# Patient Record
Sex: MALE | Race: WHITE
[De-identification: names, ages, dates, MRNs, and addresses within clinical notes are randomized per-mention and may not be internally consistent; named-entity substitution may affect disease eponyms.]

---

## 2019-04-07 ENCOUNTER — HOSPITAL ENCOUNTER (INPATIENT)
Dept: HOSPITAL 5 - ED | Age: 74
LOS: 3 days | Discharge: HOME HEALTH | DRG: 193 | End: 2019-04-10
Attending: INTERNAL MEDICINE | Admitting: INTERNAL MEDICINE
Payer: MEDICARE

## 2019-04-07 DIAGNOSIS — R09.02: ICD-10-CM

## 2019-04-07 DIAGNOSIS — E78.5: ICD-10-CM

## 2019-04-07 DIAGNOSIS — N18.1: ICD-10-CM

## 2019-04-07 DIAGNOSIS — J18.9: Primary | ICD-10-CM

## 2019-04-07 DIAGNOSIS — G93.41: ICD-10-CM

## 2019-04-07 DIAGNOSIS — E86.0: ICD-10-CM

## 2019-04-07 DIAGNOSIS — E11.65: ICD-10-CM

## 2019-04-07 DIAGNOSIS — E11.22: ICD-10-CM

## 2019-04-07 DIAGNOSIS — Z23: ICD-10-CM

## 2019-04-07 DIAGNOSIS — Z87.891: ICD-10-CM

## 2019-04-07 DIAGNOSIS — J20.9: ICD-10-CM

## 2019-04-07 DIAGNOSIS — J98.11: ICD-10-CM

## 2019-04-07 DIAGNOSIS — I12.9: ICD-10-CM

## 2019-04-07 LAB
ALBUMIN SERPL-MCNC: 3.7 G/DL (ref 3.9–5)
ALT SERPL-CCNC: 34 UNITS/L (ref 7–56)
BASOPHILS # (AUTO): 0 K/MM3 (ref 0–0.1)
BASOPHILS NFR BLD AUTO: 0.5 % (ref 0–1.8)
BILIRUB UR QL STRIP: (no result)
BLOOD UR QL VISUAL: (no result)
BUN SERPL-MCNC: 26 MG/DL (ref 9–20)
BUN/CREAT SERPL: 19 %
CALCIUM SERPL-MCNC: 9.9 MG/DL (ref 8.4–10.2)
EOSINOPHIL # BLD AUTO: 0 K/MM3 (ref 0–0.4)
EOSINOPHIL NFR BLD AUTO: 0 % (ref 0–4.3)
HCT VFR BLD CALC: 41.1 % (ref 35.5–45.6)
HEMOLYSIS INDEX: 59
HGB BLD-MCNC: 13.9 GM/DL (ref 11.8–15.2)
LYMPHOCYTES # BLD AUTO: 1.3 K/MM3 (ref 1.2–5.4)
LYMPHOCYTES NFR BLD AUTO: 15.9 % (ref 13.4–35)
MCHC RBC AUTO-ENTMCNC: 34 % (ref 32–34)
MCV RBC AUTO: 92 FL (ref 84–94)
MONOCYTES # (AUTO): 0.5 K/MM3 (ref 0–0.8)
MONOCYTES % (AUTO): 6.8 % (ref 0–7.3)
MUCOUS THREADS #/AREA URNS HPF: (no result) /HPF
PH UR STRIP: 5 [PH] (ref 5–7)
PLATELET # BLD: 167 K/MM3 (ref 140–440)
RBC # BLD AUTO: 4.47 M/MM3 (ref 3.65–5.03)
RBC #/AREA URNS HPF: 2 /HPF (ref 0–6)
UROBILINOGEN UR-MCNC: < 2 MG/DL (ref ?–2)
WBC #/AREA URNS HPF: < 1 /HPF (ref 0–6)

## 2019-04-07 PROCEDURE — 93005 ELECTROCARDIOGRAM TRACING: CPT

## 2019-04-07 PROCEDURE — 83036 HEMOGLOBIN GLYCOSYLATED A1C: CPT

## 2019-04-07 PROCEDURE — 87400 INFLUENZA A/B EACH AG IA: CPT

## 2019-04-07 PROCEDURE — 96365 THER/PROPH/DIAG IV INF INIT: CPT

## 2019-04-07 PROCEDURE — 82962 GLUCOSE BLOOD TEST: CPT

## 2019-04-07 PROCEDURE — 36415 COLL VENOUS BLD VENIPUNCTURE: CPT

## 2019-04-07 PROCEDURE — 96366 THER/PROPH/DIAG IV INF ADDON: CPT

## 2019-04-07 PROCEDURE — 81001 URINALYSIS AUTO W/SCOPE: CPT

## 2019-04-07 PROCEDURE — 93010 ELECTROCARDIOGRAM REPORT: CPT

## 2019-04-07 PROCEDURE — 90732 PPSV23 VACC 2 YRS+ SUBQ/IM: CPT

## 2019-04-07 PROCEDURE — 84484 ASSAY OF TROPONIN QUANT: CPT

## 2019-04-07 PROCEDURE — 96367 TX/PROPH/DG ADDL SEQ IV INF: CPT

## 2019-04-07 PROCEDURE — 85025 COMPLETE CBC W/AUTO DIFF WBC: CPT

## 2019-04-07 PROCEDURE — 83735 ASSAY OF MAGNESIUM: CPT

## 2019-04-07 PROCEDURE — 87040 BLOOD CULTURE FOR BACTERIA: CPT

## 2019-04-07 PROCEDURE — 82140 ASSAY OF AMMONIA: CPT

## 2019-04-07 PROCEDURE — 80053 COMPREHEN METABOLIC PANEL: CPT

## 2019-04-07 PROCEDURE — 96375 TX/PRO/DX INJ NEW DRUG ADDON: CPT

## 2019-04-07 PROCEDURE — 87086 URINE CULTURE/COLONY COUNT: CPT

## 2019-04-07 PROCEDURE — 96372 THER/PROPH/DIAG INJ SC/IM: CPT

## 2019-04-07 PROCEDURE — 71045 X-RAY EXAM CHEST 1 VIEW: CPT

## 2019-04-07 RX ADMIN — GLIPIZIDE SCH MG: 5 TABLET ORAL at 11:40

## 2019-04-07 RX ADMIN — LISINOPRIL SCH MG: 20 TABLET ORAL at 11:10

## 2019-04-07 RX ADMIN — SENNOSIDES SCH MG: 8.6 TABLET, FILM COATED ORAL at 11:45

## 2019-04-07 RX ADMIN — PRAVASTATIN SODIUM SCH MG: 40 TABLET ORAL at 22:15

## 2019-04-07 RX ADMIN — METFORMIN HYDROCHLORIDE SCH MG: 500 TABLET ORAL at 14:05

## 2019-04-07 RX ADMIN — SODIUM CHLORIDE SCH MLS/HR: 0.9 INJECTION, SOLUTION INTRAVENOUS at 12:30

## 2019-04-07 RX ADMIN — SENNOSIDES SCH MG: 8.6 TABLET, FILM COATED ORAL at 22:15

## 2019-04-07 RX ADMIN — AMLODIPINE BESYLATE SCH MG: 10 TABLET ORAL at 11:09

## 2019-04-07 RX ADMIN — AZITHROMYCIN SCH MLS/HR: 500 INJECTION, POWDER, LYOPHILIZED, FOR SOLUTION INTRAVENOUS at 10:43

## 2019-04-07 RX ADMIN — Medication SCH ML: at 22:24

## 2019-04-07 RX ADMIN — ENOXAPARIN SODIUM SCH MG: 100 INJECTION SUBCUTANEOUS at 11:09

## 2019-04-07 RX ADMIN — SODIUM CHLORIDE SCH MLS/HR: 0.9 INJECTION, SOLUTION INTRAVENOUS at 22:15

## 2019-04-07 RX ADMIN — Medication SCH ML: at 11:11

## 2019-04-07 RX ADMIN — HYDROCHLOROTHIAZIDE SCH MG: 12.5 CAPSULE ORAL at 11:09

## 2019-04-07 RX ADMIN — CEFTRIAXONE SODIUM SCH MLS/HR: 1 INJECTION, POWDER, FOR SOLUTION INTRAMUSCULAR; INTRAVENOUS at 06:09

## 2019-04-07 NOTE — XRAY REPORT
PROCEDURE: XR CHEST 1V AP 

 

TECHNIQUE:  Single AP chest 

 

HISTORY: cough 

 

COMPARISONS: No priors  

 

FINDINGS: 

 

Cardiac silhouette at the upper limits of normal. 

Mild interstitial prominence, likely chronic 

No evidence of airspace consolidation or pleural effusions. 

Right basilar atelectasis. 

 

 

IMPRESSION:  

 

Right basilar atelectasis, no other radiographic evidence of acute disease.. 

 

This document is electronically signed by Kb Shepherd MD., April 7 2019 02:13:06 AM ET

## 2019-04-07 NOTE — PROGRESS NOTE
Assessment and Plan


Assessment and plan: 


--Metabolic encephalopathy; multifactorial


Elderly patient possible dementia, rule out sepsis


Supportive care





--Right-sided atelectasis/pneumonia


Empiric antibiotics, follow cultures





--Uncontrolled diabetes mellitus; sugars and 400s


Patient is on oral hypoglycemics at home, resume metformin and glipizide


Check A1c, ADA diet





--Hypertension; well controlled


Continue current antihypertensives amlodipine


When necessary medications





--Dyslipidemia; stable on statin, low cholesterol diet





--DVT prophylaxis; Lovenox/SCD





--Full CODE STATUS





Monitor closely and adjust the management as needed





Advanced care planning 32 minutes





History


Interval history: 


Patient seen and examined in the ER elevating bed assignment


Medical records reviewed, no new events reported by the nursing


Patient is confused with altered level of consciousness


Discharge no acute distress


Vital signs noted








Hospitalist Physical





- Constitutional


Vitals: 


                                        











Temp Pulse Resp BP Pulse Ox


 


 101.9 F H  96 H  21   149/71   92 


 


 04/07/19 01:07  04/07/19 04:00  04/07/19 04:00  04/07/19 04:00  04/07/19 04:00











General appearance: Present: no acute distress, well-nourished, other ( 

confused)





- EENT


Eyes: Present: PERRL, EOM intact





- Neck


Neck: Present: supple, normal ROM





- Respiratory


Respiratory effort: normal


Respiratory: bilateral: diminished, rhonchi, negative: rales, wheezing





- Cardiovascular


Rhythm: regular


Heart Sounds: Present: S1 & S2





- Extremities


Extremities: no ischemia, No edema





- Abdominal


General gastrointestinal: soft, non-tender, non-distended, normal bowel sounds





- Integumentary


Integumentary: Present: clear, warm





- Psychiatric


Psychiatric: appropriate mood/affect, cooperative





- Neurologic


Neurologic: CNII-XII intact, moves all extremities





Results





- Labs


CBC & Chem 7: 


                                 04/07/19 02:08





                                 04/07/19 01:57


Labs: 


                             Laboratory Last Values











WBC  8.1 K/mm3 (4.5-11.0)   04/07/19  02:08    


 


RBC  4.47 M/mm3 (3.65-5.03)   04/07/19  02:08    


 


Hgb  13.9 gm/dl (11.8-15.2)   04/07/19  02:08    


 


Hct  41.1 % (35.5-45.6)   04/07/19  02:08    


 


MCV  92 fl (84-94)   04/07/19  02:08    


 


MCH  31 pg (28-32)   04/07/19  02:08    


 


MCHC  34 % (32-34)   04/07/19  02:08    


 


RDW  13.9 % (13.2-15.2)   04/07/19  02:08    


 


Plt Count  167 K/mm3 (140-440)   04/07/19  02:08    


 


Lymph % (Auto)  15.9 % (13.4-35.0)   04/07/19  02:08    


 


Mono % (Auto)  6.8 % (0.0-7.3)   04/07/19  02:08    


 


Eos % (Auto)  0.0 % (0.0-4.3)   04/07/19  02:08    


 


Baso % (Auto)  0.5 % (0.0-1.8)   04/07/19  02:08    


 


Lymph #  1.3 K/mm3 (1.2-5.4)   04/07/19  02:08    


 


Mono #  0.5 K/mm3 (0.0-0.8)   04/07/19  02:08    


 


Eos #  0.0 K/mm3 (0.0-0.4)   04/07/19  02:08    


 


Baso #  0.0 K/mm3 (0.0-0.1)   04/07/19  02:08    


 


Seg Neutrophils %  76.8 % (40.0-70.0)  H  04/07/19  02:08    


 


Seg Neutrophils #  6.2 K/mm3 (1.8-7.7)   04/07/19  02:08    


 


Sodium  138 mmol/L (137-145)   04/07/19  01:57    


 


Potassium  4.8 mmol/L (3.6-5.0)   04/07/19  01:57    


 


Chloride  98.4 mmol/L ()   04/07/19  01:57    


 


Carbon Dioxide  24 mmol/L (22-30)   04/07/19  01:57    


 


Anion Gap  20 mmol/L  04/07/19  01:57    


 


BUN  26 mg/dL (9-20)  H  04/07/19  01:57    


 


Creatinine  1.4 mg/dL (0.8-1.5)   04/07/19  01:57    


 


Estimated GFR  49 ml/min  04/07/19  01:57    


 


BUN/Creatinine Ratio  19 %  04/07/19  01:57    


 


Glucose  460 mg/dL ()  H  04/07/19  01:57    


 


POC Glucose  292  ()  H  04/07/19  06:04    


 


Lactic Acid  1.50 mmol/L (0.7-2.0)   04/07/19  04:44    


 


Calcium  9.9 mg/dL (8.4-10.2)   04/07/19  01:57    


 


Total Bilirubin  0.30 mg/dL (0.1-1.2)   04/07/19  01:57    


 


AST  39 units/L (5-40)   04/07/19  01:57    


 


ALT  34 units/L (7-56)   04/07/19  01:57    


 


Alkaline Phosphatase  63 units/L ()   04/07/19  01:57    


 


Troponin T  < 0.010 ng/mL (0.00-0.029)   04/07/19  01:57    


 


Total Protein  7.6 g/dL (6.3-8.2)   04/07/19  01:57    


 


Albumin  3.7 g/dL (3.9-5)  L  04/07/19  01:57    


 


Albumin/Globulin Ratio  0.9 %  04/07/19  01:57    


 


Urine Color  Straw  (Yellow)   04/07/19  02:23    


 


Urine Turbidity  Clear  (Clear)   04/07/19  02:23    


 


Urine pH  5.0  (5.0-7.0)   04/07/19  02:23    


 


Ur Specific Gravity  1.032  (1.003-1.030)  H  04/07/19  02:23    


 


Urine Protein  100 mg/dl mg/dL (Negative)   04/07/19  02:23    


 


Urine Glucose (UA)  >=500 mg/dL (Negative)   04/07/19  02:23    


 


Urine Ketones  20 mg/dL (Negative)   04/07/19  02:23    


 


Urine Blood  Mod  (Negative)   04/07/19  02:23    


 


Urine Nitrite  Neg  (Negative)   04/07/19  02:23    


 


Urine Bilirubin  Neg  (Negative)   04/07/19  02:23    


 


Urine Urobilinogen  < 2.0 mg/dL (<2.0)   04/07/19  02:23    


 


Ur Leukocyte Esterase  Neg  (Negative)   04/07/19  02:23    


 


Urine WBC (Auto)  < 1.0 /HPF (0.0-6.0)   04/07/19  02:23    


 


Urine RBC (Auto)  2.0 /HPF (0.0-6.0)   04/07/19  02:23    


 


Urine Mucus  Few /HPF  04/07/19  02:23    


 


Influenza A (Rapid)  Negative  (Negative)   04/07/19  03:06    


 


Influenza B (Rapid)  Negative  (Negative)   04/07/19  03:06    














Active Medications





- Current Medications


Current Medications: 














Generic Name Dose Route Start Last Admin





  Trade Name Freq  PRN Reason Stop Dose Admin


 


Acetaminophen  650 mg  04/07/19 04:37 





  Tylenol  PO  





  Q4H PRN  





  Pain MILD(1-3)/Fever >100.5/HA  


 


Amlodipine Besylate  10 mg  04/07/19 10:00 





  Norvasc  PO  





  DAILY [facility]  


 


Dextrose  50 ml  04/07/19 05:35 





  D50w (25gm) Syringe  IV  





  PRN PRN  





  Hypoglycemia  


 


Enoxaparin Sodium  30 mg  04/07/19 10:00 





  Lovenox  SUB-Q  





  QDAY [facility]  


 


Glipizide  5 mg  04/07/19 10:00 





  Glucotrol  PO  





  DAILY [facility]  


 


Hydrochlorothiazide  12.5 mg  04/07/19 10:00 





  Hctz  PO  





  QDAY [facility]  


 


Sodium Chloride  1,000 mls @ 75 mls/hr  04/07/19 05:00 





  Nacl 0.9% 1000 Ml  IV  





  AS DIRECT [facility]  


 


Azithromycin 500 mg/ Sodium  250 mls @ 250 mls/hr  04/07/19 10:00 





  Chloride  IV  





  Q24HR [facility]  


 


Ceftriaxone Sodium  1 gm in 50 mls @ 100 mls/hr  04/07/19 06:00  04/07/19 06:09





  Rocephin/Ns 1 Gm/50 Ml  IV   100 mls/hr





  Q24H [facility]   Administration





  Protocol  


 


Insulin Human Lispro  0 unit  04/07/19 06:00  04/07/19 06:09





  Humalog  SUB-Q   6 unit





  Q6HR [facility]   Administration





  Protocol  


 


Lisinopril  20 mg  04/07/19 10:00 





  Zestril  PO  





  QDAY [facility]  


 


Miscellaneous Medication  1,000 mg  04/07/19 10:00 





  Metformin Hcl [Glucophage]  PO  





  DAILY [facility]  


 


Niacin  500 mg  04/07/19 22:00 





  Niaspan Er  PO  





  HS [facility]  


 


Ondansetron HCl  4 mg  04/07/19 04:37 





  Zofran  IV  





  Q8H PRN  





  Nausea And Vomiting  


 


Pravastatin Sodium  40 mg  04/07/19 22:00 





  Pravachol  PO  





  QHS [facility]  


 


Senna  8.6 mg  04/07/19 10:00 





  Senokot  PO  





  Q12HR [facility]  


 


Sodium Chloride  10 ml  04/07/19 10:00 





  Sodium Chloride Flush Syringe 10 Ml  IV  





  BID [facility]  


 


Sodium Chloride  10 ml  04/07/19 04:37 





  Sodium Chloride Flush Syringe 10 Ml  IV  





  PRN PRN  





  LINE FLUSH

## 2019-04-07 NOTE — HISTORY AND PHYSICAL REPORT
<SAINT-MAJOR,HURLINE - Last Filed: 04/07/19 06:19>





History of Present Illness


Date of examination: 04/07/19


Date of admission: 


04/07/2019


Chief complaint: 





Weakness, change in mental status, cough


History of present illness: 





Patient is a 73-year-old male with PMHx of DM type II (insulin-dependent), 

hypertension, kidney disease who presents to the ER tonight with family for 

complaints of cough, poor appetite, and weakness.  Patient's history was 

obtained from patient's daughter in room, he states that they decided to bring 

him to the hospital because of the frequent cough.  Pt was seen in the ER with 

no acute distress, he is resting quietly, respiration is even and unlabored, his

temperature on arrival was 101.9., his blood glucose is 460, his WBCs 8.1, his 

UA is negative, his chest x-ray showed right basilar atelectasis otherwise 

normal patient is admitted for acute bronchitis and febrile illness.





Past History


Past Medical History: diabetes, hypertension, other (ckd)


Past Surgical History: No surgical history


Social history: no significant social history


Family history: no significant family history





Medications and Allergies


                                    Allergies











Allergy/AdvReac Type Severity Reaction Status Date / Time


 


No Known Allergies Allergy   Verified 04/07/19 05:55











                                Home Medications











 Medication  Instructions  Recorded  Confirmed  Last Taken  Type


 


Benazepril/Hydrochlorothiazide 1 each PO DAILY 04/07/19 04/07/19 Unknown History





[Benazepril-Hctz 20-12.5 mg Tab]     


 


Metformin HCl [Glucophage] 1,000 mg PO DAILY 04/07/19 04/07/19 Unknown History


 


Niacin [Niacor] 500 mg PO DAILY 04/07/19 04/07/19 Unknown History


 


Simvastatin [Zocor] 20 mg PO DAILY 04/07/19 04/07/19 Unknown History


 


amLODIPine [Norvasc] 10 mg PO DAILY 04/07/19 04/07/19 Unknown History


 


glipiZIDE [Glipizide] 5 mg PO DAILY 04/07/19 04/07/19 Unknown History











Active Meds: 


Active Medications





Acetaminophen (Tylenol)  650 mg PO Q4H PRN


   PRN Reason: Pain MILD(1-3)/Fever >100.5/HA


Sodium Chloride (Nacl 0.9% 1000 Ml)  1,000 mls @ 75 mls/hr IV AS DIRECT [facility]


Ondansetron HCl (Zofran)  4 mg IV Q8H PRN


   PRN Reason: Nausea And Vomiting


Senna (Senokot)  8.6 mg PO Q12HR [facility]


Sodium Chloride (Sodium Chloride Flush Syringe 10 Ml)  10 ml IV BID [facility]


Sodium Chloride (Sodium Chloride Flush Syringe 10 Ml)  10 ml IV PRN PRN


   PRN Reason: LINE FLUSH











Review of Systems


Constitutional: poor appetite


Respiratory: cough


Gastrointestinal: abdominal pain


Neurological: weakness





Exam





- Constitutional


Vitals: 


                                        











Temp Pulse Resp BP Pulse Ox


 


 101.9 F H  96 H  21   149/71   92 


 


 04/07/19 01:07  04/07/19 04:00  04/07/19 04:00  04/07/19 04:00  04/07/19 04:00











General appearance: Present: no acute distress





- EENT


Eyes: Present: EOM intact


ENT: hearing intact





- Neck


Neck: Present: normal ROM





- Respiratory


Respiratory effort: normal


Respiratory: bilateral: CTA (anterior lungs(), diminished (at the bases)





- Cardiovascular


Rhythm: regular


Heart Sounds: Present: S1 & S2 (assessment didn't take any medication on a of 

the results by)





- Extremities


Extremities: no ischemia ( her son is through the medullary)


Peripheral Pulses: within normal limits





- Abdominal


General gastrointestinal: Present: deferred


Male genitourinary: Present: deferred





- Rectal


Rectal Exam: deferred (she has)





- Integumentary


Integumentary: Present: warm, dry





- Musculoskeletal


Musculoskeletal: strength equal bilaterally





- Psychiatric


Psychiatric: cooperative





- Neurologic


Neurologic: moves all extremities





Results





- Labs


CBC & Chem 7: 


                                 04/07/19 02:08





                                 04/07/19 01:57


Labs: 


                             Laboratory Last Values











WBC  8.1 K/mm3 (4.5-11.0)   04/07/19  02:08    


 


RBC  4.47 M/mm3 (3.65-5.03)   04/07/19  02:08    


 


Hgb  13.9 gm/dl (11.8-15.2)   04/07/19  02:08    


 


Hct  41.1 % (35.5-45.6)   04/07/19  02:08    


 


MCV  92 fl (84-94)   04/07/19  02:08    


 


MCH  31 pg (28-32)   04/07/19  02:08    


 


MCHC  34 % (32-34)   04/07/19  02:08    


 


RDW  13.9 % (13.2-15.2)   04/07/19  02:08    


 


Plt Count  167 K/mm3 (140-440)   04/07/19  02:08    


 


Lymph % (Auto)  15.9 % (13.4-35.0)   04/07/19  02:08    


 


Mono % (Auto)  6.8 % (0.0-7.3)   04/07/19  02:08    


 


Eos % (Auto)  0.0 % (0.0-4.3)   04/07/19  02:08    


 


Baso % (Auto)  0.5 % (0.0-1.8)   04/07/19  02:08    


 


Lymph #  1.3 K/mm3 (1.2-5.4)   04/07/19  02:08    


 


Mono #  0.5 K/mm3 (0.0-0.8)   04/07/19  02:08    


 


Eos #  0.0 K/mm3 (0.0-0.4)   04/07/19  02:08    


 


Baso #  0.0 K/mm3 (0.0-0.1)   04/07/19  02:08    


 


Seg Neutrophils %  76.8 % (40.0-70.0)  H  04/07/19  02:08    


 


Seg Neutrophils #  6.2 K/mm3 (1.8-7.7)   04/07/19  02:08    


 


Sodium  138 mmol/L (137-145)   04/07/19  01:57    


 


Potassium  4.8 mmol/L (3.6-5.0)   04/07/19  01:57    


 


Chloride  98.4 mmol/L ()   04/07/19  01:57    


 


Carbon Dioxide  24 mmol/L (22-30)   04/07/19  01:57    


 


Anion Gap  20 mmol/L  04/07/19  01:57    


 


BUN  26 mg/dL (9-20)  H  04/07/19  01:57    


 


Creatinine  1.4 mg/dL (0.8-1.5)   04/07/19  01:57    


 


Estimated GFR  49 ml/min  04/07/19  01:57    


 


BUN/Creatinine Ratio  19 %  04/07/19  01:57    


 


Glucose  460 mg/dL ()  H  04/07/19  01:57    


 


POC Glucose  469  ()  H  04/07/19  03:35    


 


Lactic Acid  1.50 mmol/L (0.7-2.0)   04/07/19  04:44    


 


Calcium  9.9 mg/dL (8.4-10.2)   04/07/19  01:57    


 


Total Bilirubin  0.30 mg/dL (0.1-1.2)   04/07/19  01:57    


 


AST  39 units/L (5-40)   04/07/19  01:57    


 


ALT  34 units/L (7-56)   04/07/19  01:57    


 


Alkaline Phosphatase  63 units/L ()   04/07/19  01:57    


 


Troponin T  < 0.010 ng/mL (0.00-0.029)   04/07/19  01:57    


 


Total Protein  7.6 g/dL (6.3-8.2)   04/07/19  01:57    


 


Albumin  3.7 g/dL (3.9-5)  L  04/07/19  01:57    


 


Albumin/Globulin Ratio  0.9 %  04/07/19  01:57    


 


Urine Color  Straw  (Yellow)   04/07/19  02:23    


 


Urine Turbidity  Clear  (Clear)   04/07/19  02:23    


 


Urine pH  5.0  (5.0-7.0)   04/07/19  02:23    


 


Ur Specific Gravity  1.032  (1.003-1.030)  H  04/07/19  02:23    


 


Urine Protein  100 mg/dl mg/dL (Negative)   04/07/19  02:23    


 


Urine Glucose (UA)  >=500 mg/dL (Negative)   04/07/19  02:23    


 


Urine Ketones  20 mg/dL (Negative)   04/07/19  02:23    


 


Urine Blood  Mod  (Negative)   04/07/19  02:23    


 


Urine Nitrite  Neg  (Negative)   04/07/19  02:23    


 


Urine Bilirubin  Neg  (Negative)   04/07/19  02:23    


 


Urine Urobilinogen  < 2.0 mg/dL (<2.0)   04/07/19  02:23    


 


Ur Leukocyte Esterase  Neg  (Negative)   04/07/19  02:23    


 


Urine WBC (Auto)  < 1.0 /HPF (0.0-6.0)   04/07/19  02:23    


 


Urine RBC (Auto)  2.0 /HPF (0.0-6.0)   04/07/19  02:23    


 


Urine Mucus  Few /HPF  04/07/19  02:23    


 


Influenza A (Rapid)  Negative  (Negative)   04/07/19  03:06    


 


Influenza B (Rapid)  Negative  (Negative)   04/07/19  03:06    














Assessment and Plan


Assessment and plan: 





1.  Acute febrile illness


2.  Cough  (influenza A and B neg)


3.  Right basilar atelectasis for x-ray


4.  DM type II (uncontrolled) 


5.  CKD stage1 (BUN/creatinine WNL)


6. Hypertension


7. Dehydration





Plan:


Patient is admitted for febrile illness


Continue IV fluid normal saline at 75/hour


Zithromax 500 IV daily


Accu-Chek with insulin per sliding scale


Supports his care


Resume home meds


DVT prophylaxis with Lovenox


Plan of care discussed with the family


Patient's condition and plan of care d/w with Dr. Ba


Advance Directives: Yes


VTE prophylaxis?: Chemical


Reason for no VTE Prophylaxis: Surgical contraindication





<NII BA E - Last Filed: 04/07/19 06:43>





Medications and Allergies


Active Meds: 


Active Medications





Acetaminophen (Tylenol)  650 mg PO Q4H PRN


   PRN Reason: Pain MILD(1-3)/Fever >100.5/HA


Dextrose (D50w (25gm) Syringe)  50 ml IV PRN PRN


   PRN Reason: Hypoglycemia


Sodium Chloride (Nacl 0.9% 1000 Ml)  1,000 mls @ 75 mls/hr IV AS DIRECT [facility]


Azithromycin 500 mg/ Sodium (Chloride)  250 mls @ 250 mls/hr IV Q24HR [facility]


Ceftriaxone Sodium (Rocephin/Ns 1 Gm/50 Ml)  1 gm in 50 mls @ 100 mls/hr IV Q24H

 [facility]; Protocol


   Last Admin: 04/07/19 06:09 Dose:  100 mls/hr


   Documented by: 


Insulin Human Lispro (Humalog)  0 unit SUB-Q Q6HR [facility]; Protocol


   Last Admin: 04/07/19 06:09 Dose:  6 unit


   Documented by: 


Ondansetron HCl (Zofran)  4 mg IV Q8H PRN


   PRN Reason: Nausea And Vomiting


Senna (Senokot)  8.6 mg PO Q12HR [facility]


Sodium Chloride (Sodium Chloride Flush Syringe 10 Ml)  10 ml IV BID [facility]


Sodium Chloride (Sodium Chloride Flush Syringe 10 Ml)  10 ml IV PRN PRN


   PRN Reason: LINE FLUSH











Exam





- Constitutional


Vitals: 


                                        











Temp Pulse Resp BP Pulse Ox


 


 101.9 F H  96 H  21   149/71   92 


 


 04/07/19 01:07  04/07/19 04:00  04/07/19 04:00  04/07/19 04:00  04/07/19 04:00














Results





- Labs


CBC & Chem 7: 


                                 04/07/19 02:08





                                 04/07/19 01:57


Labs: 


                             Laboratory Last Values











WBC  8.1 K/mm3 (4.5-11.0)   04/07/19  02:08    


 


RBC  4.47 M/mm3 (3.65-5.03)   04/07/19  02:08    


 


Hgb  13.9 gm/dl (11.8-15.2)   04/07/19  02:08    


 


Hct  41.1 % (35.5-45.6)   04/07/19  02:08    


 


MCV  92 fl (84-94)   04/07/19  02:08    


 


MCH  31 pg (28-32)   04/07/19  02:08    


 


MCHC  34 % (32-34)   04/07/19  02:08    


 


RDW  13.9 % (13.2-15.2)   04/07/19  02:08    


 


Plt Count  167 K/mm3 (140-440)   04/07/19  02:08    


 


Lymph % (Auto)  15.9 % (13.4-35.0)   04/07/19  02:08    


 


Mono % (Auto)  6.8 % (0.0-7.3)   04/07/19  02:08    


 


Eos % (Auto)  0.0 % (0.0-4.3)   04/07/19  02:08    


 


Baso % (Auto)  0.5 % (0.0-1.8)   04/07/19  02:08    


 


Lymph #  1.3 K/mm3 (1.2-5.4)   04/07/19  02:08    


 


Mono #  0.5 K/mm3 (0.0-0.8)   04/07/19  02:08    


 


Eos #  0.0 K/mm3 (0.0-0.4)   04/07/19  02:08    


 


Baso #  0.0 K/mm3 (0.0-0.1)   04/07/19  02:08    


 


Seg Neutrophils %  76.8 % (40.0-70.0)  H  04/07/19  02:08    


 


Seg Neutrophils #  6.2 K/mm3 (1.8-7.7)   04/07/19  02:08    


 


Sodium  138 mmol/L (137-145)   04/07/19  01:57    


 


Potassium  4.8 mmol/L (3.6-5.0)   04/07/19  01:57    


 


Chloride  98.4 mmol/L ()   04/07/19  01:57    


 


Carbon Dioxide  24 mmol/L (22-30)   04/07/19  01:57    


 


Anion Gap  20 mmol/L  04/07/19  01:57    


 


BUN  26 mg/dL (9-20)  H  04/07/19  01:57    


 


Creatinine  1.4 mg/dL (0.8-1.5)   04/07/19  01:57    


 


Estimated GFR  49 ml/min  04/07/19  01:57    


 


BUN/Creatinine Ratio  19 %  04/07/19  01:57    


 


Glucose  460 mg/dL ()  H  04/07/19  01:57    


 


POC Glucose  292  ()  H  04/07/19  06:04    


 


Lactic Acid  1.50 mmol/L (0.7-2.0)   04/07/19  04:44    


 


Calcium  9.9 mg/dL (8.4-10.2)   04/07/19  01:57    


 


Total Bilirubin  0.30 mg/dL (0.1-1.2)   04/07/19  01:57    


 


AST  39 units/L (5-40)   04/07/19  01:57    


 


ALT  34 units/L (7-56)   04/07/19  01:57    


 


Alkaline Phosphatase  63 units/L ()   04/07/19  01:57    


 


Troponin T  < 0.010 ng/mL (0.00-0.029)   04/07/19  01:57    


 


Total Protein  7.6 g/dL (6.3-8.2)   04/07/19  01:57    


 


Albumin  3.7 g/dL (3.9-5)  L  04/07/19  01:57    


 


Albumin/Globulin Ratio  0.9 %  04/07/19  01:57    


 


Urine Color  Straw  (Yellow)   04/07/19  02:23    


 


Urine Turbidity  Clear  (Clear)   04/07/19  02:23    


 


Urine pH  5.0  (5.0-7.0)   04/07/19  02:23    


 


Ur Specific Gravity  1.032  (1.003-1.030)  H  04/07/19  02:23    


 


Urine Protein  100 mg/dl mg/dL (Negative)   04/07/19  02:23    


 


Urine Glucose (UA)  >=500 mg/dL (Negative)   04/07/19  02:23    


 


Urine Ketones  20 mg/dL (Negative)   04/07/19  02:23    


 


Urine Blood  Mod  (Negative)   04/07/19  02:23    


 


Urine Nitrite  Neg  (Negative)   04/07/19  02:23    


 


Urine Bilirubin  Neg  (Negative)   04/07/19  02:23    


 


Urine Urobilinogen  < 2.0 mg/dL (<2.0)   04/07/19  02:23    


 


Ur Leukocyte Esterase  Neg  (Negative)   04/07/19  02:23    


 


Urine WBC (Auto)  < 1.0 /HPF (0.0-6.0)   04/07/19  02:23    


 


Urine RBC (Auto)  2.0 /HPF (0.0-6.0)   04/07/19  02:23    


 


Urine Mucus  Few /HPF  04/07/19  02:23    


 


Influenza A (Rapid)  Negative  (Negative)   04/07/19  03:06    


 


Influenza B (Rapid)  Negative  (Negative)   04/07/19  03:06    














Assessment and Plan


Assessment and plan: 


73 year old man with hypertension, diabetes , CKD is brought to the emergency 

room by his daughter, complaining of cough, generalized weakness and decreased 

oral intake.  Chest x-ray significant for pneumonia, start Rocephin, 

azithromycin.  Patient seen and examined with NP.

## 2019-04-07 NOTE — EMERGENCY DEPARTMENT REPORT
ED Altered Mental Status HPI





- General


Chief Complaint: Altered Mental Status


Stated Complaint: HYPERGLYCEMIA


Time Seen by Provider: 04/07/19 01:11


Source: EMS


Mode of arrival: Stretcher


Limitations: Language Barrier





- History of Present Illness


Initial Comments: 





73-year-old male history of hypertension, diabetes, chronic kidney disease 

presents to ED with fever and altered mental status.  Patient's daughter states 

symptoms began yesterday.  Patient has generalized weakness, decreased by mouth 

intake and seems somewhat confused as he was urinating on himself which is 

unlike him.  Today patient developed a fever.  Reports associated cough.  

Patient denies chest pain, shortness of breath, vomiting, diarrhea, headache.  

Patient did get a flu vaccination this season.





PCP: Dr Mikey Hampton


Nephrologist: Dr Gill LIU Complaint: altered mental status


-: days(s) (2)


Severity: moderate


Consistency of Symptoms: waxing and waning


Context: recent fever, diabetes


Associated Symptoms: cough, fever/chills, loss of appetite, incontinence.  

denies: chest pain, headaches, nausea/vomiting, shortness of breath, diarrhea





- Related Data


                                    Allergies











Allergy/AdvReac Type Severity Reaction Status Date / Time


 


No Known Allergies Allergy   Unverified 11/01/16 02:50














ED Review of Systems


ROS: 


Stated complaint: HYPERGLYCEMIA


Other details as noted in HPI





Comment: All other systems reviewed and negative


Constitutional: fever


Respiratory: cough.  denies: shortness of breath


Cardiovascular: denies: chest pain


Gastrointestinal: denies: abdominal pain, nausea, vomiting, diarrhea


Genitourinary: other (reports urinary incontinence)


Neurological: denies: headache





ED Past Medical Hx





- Past Medical History


Previous Medical History?: Yes


Hx Hypertension: Yes


Hx Diabetes: Yes (type 1)


Additional medical history: HLD





- Surgical History


Past Surgical History?: No





- Social History


Smoking Status: Former Smoker


Substance Use Type: None





ED Physical Exam





- General


Limitations: Language Barrier


General appearance: alert, in no apparent distress





- Head


Head exam: Present: atraumatic, normocephalic





- Eye


Eye exam: Present: normal appearance, PERRL, EOMI





- ENT


ENT exam: Present: mucous membranes moist





- Neck


Neck exam: Present: normal inspection





- Respiratory


Respiratory exam: Present: rales (left lung base)





- Cardiovascular


Cardiovascular Exam: Present: regular rate, normal rhythm





- GI/Abdominal


GI/Abdominal exam: Present: soft.  Absent: distended, tenderness





- Extremities Exam


Extremities exam: Present: normal inspection





- Neurological Exam


Neurological exam: Present: alert, oriented X3, CN II-XII intact.  Absent: motor

sensory deficit





- Psychiatric


Psychiatric exam: Present: normal affect, normal mood





- Skin


Skin exam: Present: warm, dry, intact, normal color





ED Course


                                   Vital Signs











  04/07/19 04/07/19 04/07/19





  01:07 01:18 01:42


 


Temperature 101.9 F H  


 


Pulse Rate 93 H 94 H 


 


Respiratory 22 19 22





Rate   


 


Blood Pressure 177/77  


 


O2 Sat by Pulse 94  94





Oximetry   














  04/07/19 04/07/19 04/07/19





  02:00 03:00 04:00


 


Temperature   


 


Pulse Rate 96 H 91 H 96 H


 


Respiratory 25 H 24 21





Rate   


 


Blood Pressure 165/70 146/74 149/71


 


O2 Sat by Pulse 92 88 92





Oximetry   














- Lab Data


Result diagrams: 


                                 04/07/19 02:08





                                 04/07/19 01:57


                                   Lab Results











  04/07/19 04/07/19 04/07/19 Range/Units





  01:57 01:57 02:08 


 


WBC    8.1  (4.5-11.0)  K/mm3


 


RBC    4.47  (3.65-5.03)  M/mm3


 


Hgb    13.9  (11.8-15.2)  gm/dl


 


Hct    41.1  (35.5-45.6)  %


 


MCV    92  (84-94)  fl


 


MCH    31  (28-32)  pg


 


MCHC    34  (32-34)  %


 


RDW    13.9  (13.2-15.2)  %


 


Plt Count    167  (140-440)  K/mm3


 


Lymph % (Auto)    15.9  (13.4-35.0)  %


 


Mono % (Auto)    6.8  (0.0-7.3)  %


 


Eos % (Auto)    0.0  (0.0-4.3)  %


 


Baso % (Auto)    0.5  (0.0-1.8)  %


 


Lymph #    1.3  (1.2-5.4)  K/mm3


 


Mono #    0.5  (0.0-0.8)  K/mm3


 


Eos #    0.0  (0.0-0.4)  K/mm3


 


Baso #    0.0  (0.0-0.1)  K/mm3


 


Seg Neutrophils %    76.8 H  (40.0-70.0)  %


 


Seg Neutrophils #    6.2  (1.8-7.7)  K/mm3


 


Sodium  138    (137-145)  mmol/L


 


Potassium  4.8    (3.6-5.0)  mmol/L


 


Chloride  98.4    ()  mmol/L


 


Carbon Dioxide  24    (22-30)  mmol/L


 


Anion Gap  20    mmol/L


 


BUN  26 H    (9-20)  mg/dL


 


Creatinine  1.4    (0.8-1.5)  mg/dL


 


Estimated GFR  49    ml/min


 


BUN/Creatinine Ratio  19    %


 


Glucose  460 H    ()  mg/dL


 


POC Glucose     ()  


 


Lactic Acid   1.40   (0.7-2.0)  mmol/L


 


Calcium  9.9    (8.4-10.2)  mg/dL


 


Total Bilirubin  0.30    (0.1-1.2)  mg/dL


 


AST  39    (5-40)  units/L


 


ALT  34    (7-56)  units/L


 


Alkaline Phosphatase  63    ()  units/L


 


Troponin T  < 0.010    (0.00-0.029)  ng/mL


 


Total Protein  7.6    (6.3-8.2)  g/dL


 


Albumin  3.7 L    (3.9-5)  g/dL


 


Albumin/Globulin Ratio  0.9    %


 


Urine Color     (Yellow)  


 


Urine Turbidity     (Clear)  


 


Urine pH     (5.0-7.0)  


 


Ur Specific Gravity     (1.003-1.030)  


 


Urine Protein     (Negative)  mg/dL


 


Urine Glucose (UA)     (Negative)  mg/dL


 


Urine Ketones     (Negative)  mg/dL


 


Urine Blood     (Negative)  


 


Urine Nitrite     (Negative)  


 


Urine Bilirubin     (Negative)  


 


Urine Urobilinogen     (<2.0)  mg/dL


 


Ur Leukocyte Esterase     (Negative)  


 


Urine WBC (Auto)     (0.0-6.0)  /HPF


 


Urine RBC (Auto)     (0.0-6.0)  /HPF


 


Urine Mucus     /HPF


 


Influenza A (Rapid)     (Negative)  


 


Influenza B (Rapid)     (Negative)  














  04/07/19 04/07/19 04/07/19 Range/Units





  02:23 03:06 03:35 


 


WBC     (4.5-11.0)  K/mm3


 


RBC     (3.65-5.03)  M/mm3


 


Hgb     (11.8-15.2)  gm/dl


 


Hct     (35.5-45.6)  %


 


MCV     (84-94)  fl


 


MCH     (28-32)  pg


 


MCHC     (32-34)  %


 


RDW     (13.2-15.2)  %


 


Plt Count     (140-440)  K/mm3


 


Lymph % (Auto)     (13.4-35.0)  %


 


Mono % (Auto)     (0.0-7.3)  %


 


Eos % (Auto)     (0.0-4.3)  %


 


Baso % (Auto)     (0.0-1.8)  %


 


Lymph #     (1.2-5.4)  K/mm3


 


Mono #     (0.0-0.8)  K/mm3


 


Eos #     (0.0-0.4)  K/mm3


 


Baso #     (0.0-0.1)  K/mm3


 


Seg Neutrophils %     (40.0-70.0)  %


 


Seg Neutrophils #     (1.8-7.7)  K/mm3


 


Sodium     (137-145)  mmol/L


 


Potassium     (3.6-5.0)  mmol/L


 


Chloride     ()  mmol/L


 


Carbon Dioxide     (22-30)  mmol/L


 


Anion Gap     mmol/L


 


BUN     (9-20)  mg/dL


 


Creatinine     (0.8-1.5)  mg/dL


 


Estimated GFR     ml/min


 


BUN/Creatinine Ratio     %


 


Glucose     ()  mg/dL


 


POC Glucose    469 H  ()  


 


Lactic Acid     (0.7-2.0)  mmol/L


 


Calcium     (8.4-10.2)  mg/dL


 


Total Bilirubin     (0.1-1.2)  mg/dL


 


AST     (5-40)  units/L


 


ALT     (7-56)  units/L


 


Alkaline Phosphatase     ()  units/L


 


Troponin T     (0.00-0.029)  ng/mL


 


Total Protein     (6.3-8.2)  g/dL


 


Albumin     (3.9-5)  g/dL


 


Albumin/Globulin Ratio     %


 


Urine Color  Straw    (Yellow)  


 


Urine Turbidity  Clear    (Clear)  


 


Urine pH  5.0    (5.0-7.0)  


 


Ur Specific Gravity  1.032 H    (1.003-1.030)  


 


Urine Protein  100 mg/dl    (Negative)  mg/dL


 


Urine Glucose (UA)  >=500    (Negative)  mg/dL


 


Urine Ketones  20    (Negative)  mg/dL


 


Urine Blood  Mod    (Negative)  


 


Urine Nitrite  Neg    (Negative)  


 


Urine Bilirubin  Neg    (Negative)  


 


Urine Urobilinogen  < 2.0    (<2.0)  mg/dL


 


Ur Leukocyte Esterase  Neg    (Negative)  


 


Urine WBC (Auto)  < 1.0    (0.0-6.0)  /HPF


 


Urine RBC (Auto)  2.0    (0.0-6.0)  /HPF


 


Urine Mucus  Few    /HPF


 


Influenza A (Rapid)   Negative   (Negative)  


 


Influenza B (Rapid)   Negative   (Negative)  














  04/07/19 Range/Units





  04:44 


 


WBC   (4.5-11.0)  K/mm3


 


RBC   (3.65-5.03)  M/mm3


 


Hgb   (11.8-15.2)  gm/dl


 


Hct   (35.5-45.6)  %


 


MCV   (84-94)  fl


 


MCH   (28-32)  pg


 


MCHC   (32-34)  %


 


RDW   (13.2-15.2)  %


 


Plt Count   (140-440)  K/mm3


 


Lymph % (Auto)   (13.4-35.0)  %


 


Mono % (Auto)   (0.0-7.3)  %


 


Eos % (Auto)   (0.0-4.3)  %


 


Baso % (Auto)   (0.0-1.8)  %


 


Lymph #   (1.2-5.4)  K/mm3


 


Mono #   (0.0-0.8)  K/mm3


 


Eos #   (0.0-0.4)  K/mm3


 


Baso #   (0.0-0.1)  K/mm3


 


Seg Neutrophils %   (40.0-70.0)  %


 


Seg Neutrophils #   (1.8-7.7)  K/mm3


 


Sodium   (137-145)  mmol/L


 


Potassium   (3.6-5.0)  mmol/L


 


Chloride   ()  mmol/L


 


Carbon Dioxide   (22-30)  mmol/L


 


Anion Gap   mmol/L


 


BUN   (9-20)  mg/dL


 


Creatinine   (0.8-1.5)  mg/dL


 


Estimated GFR   ml/min


 


BUN/Creatinine Ratio   %


 


Glucose   ()  mg/dL


 


POC Glucose   ()  


 


Lactic Acid  1.50  (0.7-2.0)  mmol/L


 


Calcium   (8.4-10.2)  mg/dL


 


Total Bilirubin   (0.1-1.2)  mg/dL


 


AST   (5-40)  units/L


 


ALT   (7-56)  units/L


 


Alkaline Phosphatase   ()  units/L


 


Troponin T   (0.00-0.029)  ng/mL


 


Total Protein   (6.3-8.2)  g/dL


 


Albumin   (3.9-5)  g/dL


 


Albumin/Globulin Ratio   %


 


Urine Color   (Yellow)  


 


Urine Turbidity   (Clear)  


 


Urine pH   (5.0-7.0)  


 


Ur Specific Gravity   (1.003-1.030)  


 


Urine Protein   (Negative)  mg/dL


 


Urine Glucose (UA)   (Negative)  mg/dL


 


Urine Ketones   (Negative)  mg/dL


 


Urine Blood   (Negative)  


 


Urine Nitrite   (Negative)  


 


Urine Bilirubin   (Negative)  


 


Urine Urobilinogen   (<2.0)  mg/dL


 


Ur Leukocyte Esterase   (Negative)  


 


Urine WBC (Auto)   (0.0-6.0)  /HPF


 


Urine RBC (Auto)   (0.0-6.0)  /HPF


 


Urine Mucus   /HPF


 


Influenza A (Rapid)   (Negative)  


 


Influenza B (Rapid)   (Negative)  














- EKG Data


-: EKG Interpreted by Me


EKG shows normal: sinus rhythm, axis, intervals, QRS complexes, ST-T waves


Rate: normal


Interpretation: no acute changes





- Radiology Data


Radiology results: report reviewed





- Medical Decision Making





73-year-old male presents to ED with fever and cough 2 days.  State patient is 

not acting like himself, as he urinated on himself on yesterday.  The patient is

appropriate here in ED alert and oriented 3.  Temperature 101 here in the ED, 

with O2 sats 89% on room air.  Patient coughing on exam.  Chest x-ray shows 

atelectasis, but no definite infiltrate.  Possible bronchitis.  Flu testing 

negative, urine negative for infection.  Patient also hyperglycemic, however no 

signs of DKA.  IV fluids and insulin given.  Blood cultures and urine cultures 

sent.  Patient covered with one dose of Zosyn.  Patient currently on 4 L of O2. 

Will admit to hospitalist, Dr. Ba.





- Differential Diagnosis


pneumonia, UTI, bronchitis


Critical care attestation.: 


If time is entered above; I have spent that time in minutes in the direct care 

of this critically ill patient, excluding procedure time.








ED Disposition


Clinical Impression: 


 Fever, Hypoxia, Cough, Hyperglycemia





Disposition: DC-09 OP ADMIT IP TO THIS HOSP


Is pt being admited?: Yes


Condition: Stable


Referrals: 


DEANN HOLLIS MD [Staff Physician] - 3-5 Days


Time of Disposition: 04:03

## 2019-04-08 LAB
ALBUMIN SERPL-MCNC: 2.8 G/DL (ref 3.9–5)
ALT SERPL-CCNC: 32 UNITS/L (ref 7–56)
BASOPHILS # (AUTO): 0 K/MM3 (ref 0–0.1)
BASOPHILS NFR BLD AUTO: 0.3 % (ref 0–1.8)
BUN SERPL-MCNC: 21 MG/DL (ref 9–20)
BUN/CREAT SERPL: 21 %
CALCIUM SERPL-MCNC: 8.7 MG/DL (ref 8.4–10.2)
EOSINOPHIL # BLD AUTO: 0.1 K/MM3 (ref 0–0.4)
EOSINOPHIL NFR BLD AUTO: 1.2 % (ref 0–4.3)
HCT VFR BLD CALC: 37.3 % (ref 35.5–45.6)
HEMOLYSIS INDEX: 9
HGB BLD-MCNC: 12.6 GM/DL (ref 11.8–15.2)
LYMPHOCYTES # BLD AUTO: 2.1 K/MM3 (ref 1.2–5.4)
LYMPHOCYTES NFR BLD AUTO: 25.7 % (ref 13.4–35)
MCHC RBC AUTO-ENTMCNC: 34 % (ref 32–34)
MCV RBC AUTO: 92 FL (ref 84–94)
MONOCYTES # (AUTO): 0.7 K/MM3 (ref 0–0.8)
MONOCYTES % (AUTO): 8.6 % (ref 0–7.3)
PLATELET # BLD: 174 K/MM3 (ref 140–440)
RBC # BLD AUTO: 4.04 M/MM3 (ref 3.65–5.03)

## 2019-04-08 PROCEDURE — 3E0234Z INTRODUCTION OF SERUM, TOXOID AND VACCINE INTO MUSCLE, PERCUTANEOUS APPROACH: ICD-10-PCS | Performed by: INTERNAL MEDICINE

## 2019-04-08 RX ADMIN — CEFTRIAXONE SODIUM SCH MLS/HR: 1 INJECTION, POWDER, FOR SOLUTION INTRAMUSCULAR; INTRAVENOUS at 06:14

## 2019-04-08 RX ADMIN — LISINOPRIL SCH MG: 20 TABLET ORAL at 09:05

## 2019-04-08 RX ADMIN — NIACIN SCH MG: 500 TABLET, FILM COATED, EXTENDED RELEASE ORAL at 22:28

## 2019-04-08 RX ADMIN — METFORMIN HYDROCHLORIDE SCH MG: 500 TABLET ORAL at 08:27

## 2019-04-08 RX ADMIN — AMLODIPINE BESYLATE SCH MG: 10 TABLET ORAL at 09:06

## 2019-04-08 RX ADMIN — Medication SCH ML: at 09:07

## 2019-04-08 RX ADMIN — GLIPIZIDE SCH MG: 5 TABLET ORAL at 22:28

## 2019-04-08 RX ADMIN — SENNOSIDES SCH MG: 8.6 TABLET, FILM COATED ORAL at 22:29

## 2019-04-08 RX ADMIN — HYDROCHLOROTHIAZIDE SCH MG: 12.5 CAPSULE ORAL at 09:06

## 2019-04-08 RX ADMIN — PRAVASTATIN SODIUM SCH MG: 40 TABLET ORAL at 22:28

## 2019-04-08 RX ADMIN — ENOXAPARIN SODIUM SCH MG: 100 INJECTION SUBCUTANEOUS at 09:07

## 2019-04-08 RX ADMIN — NIACIN SCH: 500 TABLET, FILM COATED, EXTENDED RELEASE ORAL at 03:25

## 2019-04-08 RX ADMIN — GLIPIZIDE SCH MG: 5 TABLET ORAL at 08:27

## 2019-04-08 RX ADMIN — Medication SCH ML: at 22:29

## 2019-04-08 RX ADMIN — AZITHROMYCIN SCH MLS/HR: 500 INJECTION, POWDER, LYOPHILIZED, FOR SOLUTION INTRAVENOUS at 12:55

## 2019-04-08 RX ADMIN — SENNOSIDES SCH MG: 8.6 TABLET, FILM COATED ORAL at 09:05

## 2019-04-08 NOTE — PROGRESS NOTE
Assessment and Plan


Assessment and plan: 


--Metabolic encephalopathy; multifactorial


Today significantly improved, alert awake responding appropriately


Continue supportive care





--Right-sided atelectasis/pneumonia


Empiric antibiotics, follow cultures





--Uncontrolled diabetes mellitus; sugars and 400s


Patient is on oral hypoglycemics at home, resume metformin and glipizide


Insulin 7030 , adjust as needed Check A1c 11.9, ADA diet


Diabetic education, nutrition education, possible home health nurse for disease 

monitoring at discharge





--Hypertension; well controlled


Continue current antihypertensives amlodipine


When necessary medications





--Dyslipidemia; stable on statin, low cholesterol diet





--DVT prophylaxis; Lovenox/SCD





--Full CODE STATUS





--Physical therapy for general debility and home health needs





Monitor closely and adjust the management as needed





Time of care reviewed with the patient and his nurse











History


Interval history: 


Patient seen and examined medical records reviewed 


patient feels better alert and awake and responding appropriately


No new events reported by the nursing


Blood sugars remain uncontrolled but then yesterday


Patient tolerated breakfast


Vital signs noted











Hospitalist Physical





- Constitutional


Vitals: 


                                        











Temp Pulse Resp BP Pulse Ox


 


 98.7 F   81   20   128/69   98 


 


 04/08/19 07:31  04/08/19 09:06  04/08/19 07:31  04/08/19 09:06  04/08/19 07:31











General appearance: Present: no acute distress, well-nourished, other ( 

confused)





- EENT


Eyes: Present: PERRL, EOM intact





- Neck


Neck: Present: supple, normal ROM





- Respiratory


Respiratory effort: normal


Respiratory: bilateral: diminished, negative: rales, rhonchi, wheezing





- Cardiovascular


Rhythm: regular


Heart Sounds: Present: S1 & S2





- Extremities


Extremities: no ischemia, No edema





- Abdominal


General gastrointestinal: soft, non-tender, non-distended, normal bowel sounds





- Integumentary


Integumentary: Present: clear, warm





- Psychiatric


Psychiatric: appropriate mood/affect, cooperative





- Neurologic


Neurologic: moves all extremities





Results





- Labs


CBC & Chem 7: 


                                 04/08/19 02:58





                                 04/08/19 02:58


Labs: 


                             Laboratory Last Values











WBC  8.2 K/mm3 (4.5-11.0)   04/08/19  02:58    


 


RBC  4.04 M/mm3 (3.65-5.03)   04/08/19  02:58    


 


Hgb  12.6 gm/dl (11.8-15.2)   04/08/19  02:58    


 


Hct  37.3 % (35.5-45.6)   04/08/19  02:58    


 


MCV  92 fl (84-94)   04/08/19  02:58    


 


MCH  31 pg (28-32)   04/08/19  02:58    


 


MCHC  34 % (32-34)   04/08/19  02:58    


 


RDW  13.9 % (13.2-15.2)   04/08/19  02:58    


 


Plt Count  174 K/mm3 (140-440)   04/08/19  02:58    


 


Lymph % (Auto)  25.7 % (13.4-35.0)   04/08/19  02:58    


 


Mono % (Auto)  8.6 % (0.0-7.3)  H  04/08/19  02:58    


 


Eos % (Auto)  1.2 % (0.0-4.3)   04/08/19  02:58    


 


Baso % (Auto)  0.3 % (0.0-1.8)   04/08/19  02:58    


 


Lymph #  2.1 K/mm3 (1.2-5.4)   04/08/19  02:58    


 


Mono #  0.7 K/mm3 (0.0-0.8)   04/08/19  02:58    


 


Eos #  0.1 K/mm3 (0.0-0.4)   04/08/19  02:58    


 


Baso #  0.0 K/mm3 (0.0-0.1)   04/08/19  02:58    


 


Seg Neutrophils %  64.2 % (40.0-70.0)   04/08/19  02:58    


 


Seg Neutrophils #  5.2 K/mm3 (1.8-7.7)   04/08/19  02:58    


 


Sodium  139 mmol/L (137-145)   04/08/19  02:58    


 


Potassium  3.9 mmol/L (3.6-5.0)   04/08/19  02:58    


 


Chloride  104.1 mmol/L ()   04/08/19  02:58    


 


Carbon Dioxide  25 mmol/L (22-30)   04/08/19  02:58    


 


Anion Gap  14 mmol/L  04/08/19  02:58    


 


BUN  21 mg/dL (9-20)  H  04/08/19  02:58    


 


Creatinine  1.0 mg/dL (0.8-1.5)   04/08/19  02:58    


 


Estimated GFR  > 60 ml/min  04/08/19  02:58    


 


BUN/Creatinine Ratio  21 %  04/08/19  02:58    


 


Glucose  251 mg/dL ()  H  04/08/19  02:58    


 


POC Glucose  206  ()  H  04/08/19  07:37    


 


Hemoglobin A1c  11.9 % (4-6)  H  04/08/19  02:58    


 


Lactic Acid  1.50 mmol/L (0.7-2.0)   04/07/19  04:44    


 


Calcium  8.7 mg/dL (8.4-10.2)   04/08/19  02:58    


 


Magnesium  2.00 mg/dL (1.7-2.3)   04/08/19  02:58    


 


Total Bilirubin  0.20 mg/dL (0.1-1.2)   04/08/19  02:58    


 


AST  35 units/L (5-40)   04/08/19  02:58    


 


ALT  32 units/L (7-56)   04/08/19  02:58    


 


Alkaline Phosphatase  51 units/L ()   04/08/19  02:58    


 


Troponin T  < 0.010 ng/mL (0.00-0.029)   04/07/19  01:57    


 


Total Protein  6.3 g/dL (6.3-8.2)   04/08/19  02:58    


 


Albumin  2.8 g/dL (3.9-5)  L  04/08/19  02:58    


 


Albumin/Globulin Ratio  0.8 %  04/08/19  02:58    


 


Urine Color  Straw  (Yellow)   04/07/19  02:23    


 


Urine Turbidity  Clear  (Clear)   04/07/19  02:23    


 


Urine pH  5.0  (5.0-7.0)   04/07/19  02:23    


 


Ur Specific Gravity  1.032  (1.003-1.030)  H  04/07/19  02:23    


 


Urine Protein  100 mg/dl mg/dL (Negative)   04/07/19  02:23    


 


Urine Glucose (UA)  >=500 mg/dL (Negative)   04/07/19  02:23    


 


Urine Ketones  20 mg/dL (Negative)   04/07/19  02:23    


 


Urine Blood  Mod  (Negative)   04/07/19  02:23    


 


Urine Nitrite  Neg  (Negative)   04/07/19  02:23    


 


Urine Bilirubin  Neg  (Negative)   04/07/19  02:23    


 


Urine Urobilinogen  < 2.0 mg/dL (<2.0)   04/07/19  02:23    


 


Ur Leukocyte Esterase  Neg  (Negative)   04/07/19  02:23    


 


Urine WBC (Auto)  < 1.0 /HPF (0.0-6.0)   04/07/19  02:23    


 


Urine RBC (Auto)  2.0 /HPF (0.0-6.0)   04/07/19  02:23    


 


Urine Mucus  Few /HPF  04/07/19  02:23    


 


Influenza A (Rapid)  Negative  (Negative)   04/07/19  03:06    


 


Influenza B (Rapid)  Negative  (Negative)   04/07/19  03:06    














Active Medications





- Current Medications


Current Medications: 














Generic Name Dose Route Start Last Admin





  Trade Name Freq  PRN Reason Stop Dose Admin


 


Acetaminophen  650 mg  04/07/19 04:37 





  Tylenol  PO  





  Q4H PRN  





  Pain MILD(1-3)/Fever >100.5/HA  


 


Albuterol  2.5 mg  04/07/19 17:48 





  Proventil  IH  





  Q4HRT PRN  





  Shortness Of Breath  


 


Amlodipine Besylate  10 mg  04/07/19 10:00  04/08/19 09:06





  Norvasc  PO   10 mg





  DAILY FRANCISCO JAVIER   Administration


 


Dextrose  50 ml  04/07/19 05:35 





  D50w (25gm) Syringe  IV  





  PRN PRN  





  Hypoglycemia  


 


Enoxaparin Sodium  40 mg  04/07/19 10:00  04/08/19 09:07





  Lovenox  SUB-Q   40 mg





  QDAY@1000 FRANCISCO JAVIER   Administration


 


Glipizide  5 mg  04/07/19 10:00  04/08/19 08:27





  Glucotrol  PO   5 mg





  QDDIAB FRANCISCO JAVIER   Administration


 


Hydrochlorothiazide  12.5 mg  04/07/19 10:00  04/08/19 09:06





  Hctz  PO   12.5 mg





  QDAY FRANCISCO JAVIER   Administration


 


Sodium Chloride  1,000 mls @ 75 mls/hr  04/07/19 05:00  04/07/19 22:15





  Nacl 0.9% 1000 Ml  IV   75 mls/hr





  AS DIRECT FRANCISCO JAVIER   Administration


 


Azithromycin 500 mg/ Sodium  250 mls @ 250 mls/hr  04/07/19 10:00  04/07/19 

10:43





  Chloride  IV   250 mls/hr





  Q24HR FRANCISCO JAVIER   Administration


 


Ceftriaxone Sodium  1 gm in 50 mls @ 100 mls/hr  04/07/19 06:00  04/08/19 06:14





  Rocephin/Ns 1 Gm/50 Ml  IV   100 mls/hr





  Q24H FRANCISCO JAVIER   Administration





  Protocol  


 


Insulin Human Isoph/Insulin Regular  8 unit  04/08/19 08:00  04/08/19 08:26





  Humulin 70/30  SUB-Q   8 unit





  BIDDIAB FRANCISCO JAVIER   Administration


 


Insulin Human Lispro  0 unit  04/07/19 06:00  04/08/19 00:00





  Humalog  SUB-Q   6 unit





  Q6HR FRANCISCO JAVIER   Administration





  Protocol  


 


Lisinopril  20 mg  04/07/19 10:00  04/08/19 09:05





  Zestril  PO   20 mg





  QDAY FRANCISCO JAVIER   Administration


 


Metformin HCl  1,000 mg  04/07/19 14:00  04/08/19 08:27





  Glucophage  PO   1,000 mg





  QDDIAB FRANCISCO JAVIER   Administration


 


Niacin  500 mg  04/07/19 22:00  04/08/19 03:25





  Niaspan Er  PO   Not Given





  [facility]  


 


Ondansetron HCl  4 mg  04/07/19 04:37 





  Zofran  IV  





  Q8H PRN  





  Nausea And Vomiting  


 


Pneumococcal Polyvalent Vaccine  0.5 ml  04/08/19 12:00 





  Pneumovax 23  IM  04/08/19 12:01 





  .ONCE ONE  


 


Pravastatin Sodium  40 mg  04/07/19 22:00  04/07/19 22:15





  Pravachol  PO   40 mg





  QHS FRANCISCO JAVIER   Administration


 


Senna  8.6 mg  04/07/19 10:00  04/08/19 09:05





  Senokot  PO   8.6 mg





  Q12HR FRANCISCO JAVIER   Administration


 


Sodium Chloride  10 ml  04/07/19 10:00  04/08/19 09:07





  Sodium Chloride Flush Syringe 10 Ml  IV   10 ml





  BID FRANCISCO JAVIER   Administration


 


Sodium Chloride  10 ml  04/07/19 04:37 





  Sodium Chloride Flush Syringe 10 Ml  IV  





  PRN PRN  





  LINE FLUSH

## 2019-04-09 RX ADMIN — SODIUM CHLORIDE SCH MLS/HR: 0.9 INJECTION, SOLUTION INTRAVENOUS at 02:55

## 2019-04-09 RX ADMIN — GLIPIZIDE SCH MG: 5 TABLET ORAL at 09:09

## 2019-04-09 RX ADMIN — Medication SCH ML: at 22:31

## 2019-04-09 RX ADMIN — GLIPIZIDE SCH MG: 5 TABLET ORAL at 22:30

## 2019-04-09 RX ADMIN — AZITHROMYCIN SCH MG: 250 TABLET, FILM COATED ORAL at 09:08

## 2019-04-09 RX ADMIN — CEFTRIAXONE SODIUM SCH MLS/HR: 1 INJECTION, POWDER, FOR SOLUTION INTRAMUSCULAR; INTRAVENOUS at 05:38

## 2019-04-09 RX ADMIN — PRAVASTATIN SODIUM SCH MG: 40 TABLET ORAL at 22:30

## 2019-04-09 RX ADMIN — SENNOSIDES SCH MG: 8.6 TABLET, FILM COATED ORAL at 22:30

## 2019-04-09 RX ADMIN — AMLODIPINE BESYLATE SCH MG: 10 TABLET ORAL at 09:10

## 2019-04-09 RX ADMIN — ENOXAPARIN SODIUM SCH MG: 100 INJECTION SUBCUTANEOUS at 09:09

## 2019-04-09 RX ADMIN — METFORMIN HYDROCHLORIDE SCH MG: 500 TABLET ORAL at 09:09

## 2019-04-09 RX ADMIN — LISINOPRIL SCH MG: 20 TABLET ORAL at 09:11

## 2019-04-09 RX ADMIN — SENNOSIDES SCH MG: 8.6 TABLET, FILM COATED ORAL at 09:09

## 2019-04-09 RX ADMIN — NIACIN SCH MG: 500 TABLET, FILM COATED, EXTENDED RELEASE ORAL at 22:30

## 2019-04-09 RX ADMIN — HYDROCHLOROTHIAZIDE SCH MG: 12.5 CAPSULE ORAL at 09:10

## 2019-04-09 RX ADMIN — Medication SCH ML: at 09:09

## 2019-04-09 NOTE — PROGRESS NOTE
Assessment and Plan


Assessment and plan: 


--Metabolic encephalopathy; multifactorial


Resolved, patient back to baseline





--Right-sided atelectasis/pneumonia


Empiric antibiotics, follow cultures





--Uncontrolled diabetes mellitus; continue metformin, glipizide and


Insulin 7030 14 units twice a day, A1c 11.9, ADA diet


Diabetic education, nutrition education,


Home health nurse for disease monitoring at discharge





--Hypertension; well controlled


Continue current antihypertensives amlodipine


When necessary medications





--Dyslipidemia; stable on statin, low cholesterol diet





--DVT prophylaxis; Lovenox/SCD





--Full CODE STATUS





--Physical therapy for general debility and home health needs





Increase ambulation





Possible discharge tomorrow if stable


Possible discharge home tomorrow if stable








History


Interval history: 


Patient examined medical records reviewed


No new events reported by the nursing staff


Recent blood sugars are reasonable levels


Alert and awake and responding appropriately


Vital signs noted








Hospitalist Physical





- Constitutional


Vitals: 


                                        











Temp Pulse Resp BP Pulse Ox


 


 99.3 F   82   20   138/67   96 


 


 04/09/19 13:00  04/09/19 13:00  04/09/19 13:00  04/09/19 13:00  04/09/19 13:00











General appearance: Present: no acute distress, well-nourished





- EENT


Eyes: Present: PERRL, EOM intact





- Neck


Neck: Present: supple, normal ROM





- Respiratory


Respiratory effort: normal


Respiratory: bilateral: diminished, negative: rales, rhonchi, wheezing





- Cardiovascular


Rhythm: regular


Heart Sounds: Present: S1 & S2





- Extremities


Extremities: no ischemia, No edema





- Abdominal


General gastrointestinal: soft, non-tender, non-distended, normal bowel sounds





- Integumentary


Integumentary: Present: clear, warm





- Psychiatric


Psychiatric: appropriate mood/affect, cooperative





- Neurologic


Neurologic: moves all extremities





Results





- Labs


CBC & Chem 7: 


                                 04/08/19 02:58





                                 04/08/19 02:58


Labs: 


                             Laboratory Last Values











WBC  8.2 K/mm3 (4.5-11.0)   04/08/19  02:58    


 


RBC  4.04 M/mm3 (3.65-5.03)   04/08/19  02:58    


 


Hgb  12.6 gm/dl (11.8-15.2)   04/08/19  02:58    


 


Hct  37.3 % (35.5-45.6)   04/08/19  02:58    


 


MCV  92 fl (84-94)   04/08/19  02:58    


 


MCH  31 pg (28-32)   04/08/19  02:58    


 


MCHC  34 % (32-34)   04/08/19  02:58    


 


RDW  13.9 % (13.2-15.2)   04/08/19  02:58    


 


Plt Count  174 K/mm3 (140-440)   04/08/19  02:58    


 


Lymph % (Auto)  25.7 % (13.4-35.0)   04/08/19  02:58    


 


Mono % (Auto)  8.6 % (0.0-7.3)  H  04/08/19  02:58    


 


Eos % (Auto)  1.2 % (0.0-4.3)   04/08/19  02:58    


 


Baso % (Auto)  0.3 % (0.0-1.8)   04/08/19  02:58    


 


Lymph #  2.1 K/mm3 (1.2-5.4)   04/08/19  02:58    


 


Mono #  0.7 K/mm3 (0.0-0.8)   04/08/19  02:58    


 


Eos #  0.1 K/mm3 (0.0-0.4)   04/08/19  02:58    


 


Baso #  0.0 K/mm3 (0.0-0.1)   04/08/19  02:58    


 


Seg Neutrophils %  64.2 % (40.0-70.0)   04/08/19  02:58    


 


Seg Neutrophils #  5.2 K/mm3 (1.8-7.7)   04/08/19  02:58    


 


Sodium  139 mmol/L (137-145)   04/08/19  02:58    


 


Potassium  3.9 mmol/L (3.6-5.0)   04/08/19  02:58    


 


Chloride  104.1 mmol/L ()   04/08/19  02:58    


 


Carbon Dioxide  25 mmol/L (22-30)   04/08/19  02:58    


 


Anion Gap  14 mmol/L  04/08/19  02:58    


 


BUN  21 mg/dL (9-20)  H  04/08/19  02:58    


 


Creatinine  1.0 mg/dL (0.8-1.5)   04/08/19  02:58    


 


Estimated GFR  > 60 ml/min  04/08/19  02:58    


 


BUN/Creatinine Ratio  21 %  04/08/19  02:58    


 


Glucose  251 mg/dL ()  H  04/08/19  02:58    


 


POC Glucose  260  ()  H  04/09/19  12:03    


 


Hemoglobin A1c  11.9 % (4-6)  H  04/08/19  02:58    


 


Lactic Acid  1.50 mmol/L (0.7-2.0)   04/07/19  04:44    


 


Calcium  8.7 mg/dL (8.4-10.2)   04/08/19  02:58    


 


Magnesium  2.00 mg/dL (1.7-2.3)   04/08/19  02:58    


 


Total Bilirubin  0.20 mg/dL (0.1-1.2)   04/08/19  02:58    


 


AST  35 units/L (5-40)   04/08/19  02:58    


 


ALT  32 units/L (7-56)   04/08/19  02:58    


 


Alkaline Phosphatase  51 units/L ()   04/08/19  02:58    


 


Troponin T  < 0.010 ng/mL (0.00-0.029)   04/07/19  01:57    


 


Total Protein  6.3 g/dL (6.3-8.2)   04/08/19  02:58    


 


Albumin  2.8 g/dL (3.9-5)  L  04/08/19  02:58    


 


Albumin/Globulin Ratio  0.8 %  04/08/19  02:58    


 


Urine Color  Straw  (Yellow)   04/07/19  02:23    


 


Urine Turbidity  Clear  (Clear)   04/07/19  02:23    


 


Urine pH  5.0  (5.0-7.0)   04/07/19  02:23    


 


Ur Specific Gravity  1.032  (1.003-1.030)  H  04/07/19  02:23    


 


Urine Protein  100 mg/dl mg/dL (Negative)   04/07/19  02:23    


 


Urine Glucose (UA)  >=500 mg/dL (Negative)   04/07/19  02:23    


 


Urine Ketones  20 mg/dL (Negative)   04/07/19  02:23    


 


Urine Blood  Mod  (Negative)   04/07/19  02:23    


 


Urine Nitrite  Neg  (Negative)   04/07/19  02:23    


 


Urine Bilirubin  Neg  (Negative)   04/07/19  02:23    


 


Urine Urobilinogen  < 2.0 mg/dL (<2.0)   04/07/19  02:23    


 


Ur Leukocyte Esterase  Neg  (Negative)   04/07/19  02:23    


 


Urine WBC (Auto)  < 1.0 /HPF (0.0-6.0)   04/07/19  02:23    


 


Urine RBC (Auto)  2.0 /HPF (0.0-6.0)   04/07/19  02:23    


 


Urine Mucus  Few /HPF  04/07/19  02:23    


 


Influenza A (Rapid)  Negative  (Negative)   04/07/19  03:06    


 


Influenza B (Rapid)  Negative  (Negative)   04/07/19  03:06    














Active Medications





- Current Medications


Current Medications: 














Generic Name Dose Route Start Last Admin





  Trade Name Freq  PRN Reason Stop Dose Admin


 


Acetaminophen  650 mg  04/07/19 04:37 





  Tylenol  PO  





  Q4H PRN  





  Pain MILD(1-3)/Fever >100.5/HA  


 


Albuterol  2.5 mg  04/07/19 17:48 





  Proventil  IH  





  Q4HRT PRN  





  Shortness Of Breath  


 


Amlodipine Besylate  10 mg  04/07/19 10:00  04/09/19 09:10





  Norvasc  PO   10 mg





  DAILY FRANCISCO JAVIER   Administration


 


Azithromycin  500 mg  04/09/19 10:00  04/09/19 09:08





  Zithromax  PO  04/11/19 10:01  500 mg





  QDAY FRANCISCO JAVIER   Administration


 


Dextrose  50 ml  04/07/19 05:35 





  D50w (25gm) Syringe  IV  





  PRN PRN  





  Hypoglycemia  


 


Enoxaparin Sodium  40 mg  04/07/19 10:00  04/09/19 09:09





  Lovenox  SUB-Q   40 mg





  QDAY@1000 FRANCISCO JAVIER   Administration


 


Glipizide  5 mg  04/08/19 22:00  04/09/19 09:09





  Glucotrol  PO   5 mg





  BID FRANCISCO JAVIER   Administration


 


Hydrochlorothiazide  12.5 mg  04/07/19 10:00  04/09/19 09:10





  Hctz  PO   12.5 mg





  QDAY FRANCISCO JAVIER   Administration


 


Sodium Chloride  1,000 mls @ 75 mls/hr  04/07/19 05:00  04/09/19 02:55





  Nacl 0.9% 1000 Ml  IV   75 mls/hr





  AS DIRECT FRANCISCO JAVIER   Administration


 


Ceftriaxone Sodium  1 gm in 50 mls @ 100 mls/hr  04/07/19 06:00  04/09/19 05:38





  Rocephin/Ns 1 Gm/50 Ml  IV   100 mls/hr





  Q24H FRANCISCO JAVIER   Administration





  Protocol  


 


Insulin Human Isoph/Insulin Regular  14 unit  04/08/19 10:00  04/09/19 09:08





  Humulin 70/30  SUB-Q   14 unit





  BIDDIAB FRANCISCO JAVIER   Administration


 


Insulin Human Lispro  0 unit  04/07/19 06:00  04/09/19 12:51





  Humalog  SUB-Q   6 unit





  Q6HR FRANCISCO JAVIER   Administration





  Protocol  


 


Lisinopril  20 mg  04/07/19 10:00  04/09/19 09:11





  Zestril  PO   20 mg





  QDAY FRANCISCO JAVIER   Administration


 


Metformin HCl  1,000 mg  04/07/19 14:00  04/09/19 09:09





  Glucophage  PO   1,000 mg





  QDDIAB FRANCISCO JAVIER   Administration


 


Niacin  500 mg  04/07/19 22:00  04/08/19 22:28





  Niaspan Er  PO   500 mg





  HS FRANCISCO JAVIER   Administration


 


Ondansetron HCl  4 mg  04/07/19 04:37 





  Zofran  IV  





  Q8H PRN  





  Nausea And Vomiting  


 


Pravastatin Sodium  40 mg  04/07/19 22:00  04/08/19 22:28





  Pravachol  PO   40 mg





  QHS FRANCISCO JAVIER   Administration


 


Senna  8.6 mg  04/07/19 10:00  04/09/19 09:09





  Senokot  PO   8.6 mg





  Q12HR FRANCISCO JAVIER   Administration


 


Sodium Chloride  10 ml  04/07/19 10:00  04/09/19 09:09





  Sodium Chloride Flush Syringe 10 Ml  IV   10 ml





  BID FRANCISCO JAVIER   Administration


 


Sodium Chloride  10 ml  04/07/19 04:37 





  Sodium Chloride Flush Syringe 10 Ml  IV  





  PRN PRN  





  LINE FLUSH

## 2019-04-10 VITALS — DIASTOLIC BLOOD PRESSURE: 63 MMHG | SYSTOLIC BLOOD PRESSURE: 147 MMHG

## 2019-04-10 RX ADMIN — SENNOSIDES SCH MG: 8.6 TABLET, FILM COATED ORAL at 09:04

## 2019-04-10 RX ADMIN — CEFTRIAXONE SODIUM SCH MLS/HR: 1 INJECTION, POWDER, FOR SOLUTION INTRAMUSCULAR; INTRAVENOUS at 05:42

## 2019-04-10 RX ADMIN — ENOXAPARIN SODIUM SCH MG: 100 INJECTION SUBCUTANEOUS at 09:03

## 2019-04-10 RX ADMIN — HYDROCHLOROTHIAZIDE SCH MG: 12.5 CAPSULE ORAL at 09:05

## 2019-04-10 RX ADMIN — Medication SCH ML: at 09:05

## 2019-04-10 RX ADMIN — AMLODIPINE BESYLATE SCH MG: 10 TABLET ORAL at 09:05

## 2019-04-10 RX ADMIN — GLIPIZIDE SCH MG: 5 TABLET ORAL at 09:05

## 2019-04-10 RX ADMIN — LISINOPRIL SCH MG: 20 TABLET ORAL at 09:04

## 2019-04-10 RX ADMIN — AZITHROMYCIN SCH MG: 250 TABLET, FILM COATED ORAL at 09:04

## 2019-04-10 RX ADMIN — METFORMIN HYDROCHLORIDE SCH MG: 500 TABLET ORAL at 09:05

## 2019-04-10 NOTE — DISCHARGE SUMMARY
Providers





- Providers


Date of Admission: 


04/07/19 06:33





Date of discharge: 04/10/19


Attending physician: 


AMY J KOCHERLA





                                        





04/08/19 10:11


Physical Therapy Evaluation and Treat [CONS] Routine 


   Comment: 


   Reason For Exam: gen debility/Home PT needs











Primary care physician: 


TIARA HALL








Hospitalization


Reason for admission: Altered mental status,fever,uncontrolled blood sugars.


Condition: Stable


Pertinent studies: 





CXR : Rt basilar atelectasis


Hospital course: 


73-year-old male with PMHx of DM type II (insulin-dependent), hypertension, 

kidney disease  was admitted through ER with gen weakness ,altered level of 

consciousness and uncontrolled Diabetes. Noted to have high bl.sugars .


Admitted and managed symptomatically,treated rt atelectasis vs pneumonia with 

appropriate antibiotics 


Symptoms improved,blood sugars controlled and today is comfortable,no new 

complaints,vital signs stable


Physical exam is unremarkable.Stable at discharge.





Discharge Diagnosis:


--Metabolic encephalopathy; multifactorial


Resolved, patient back to baseline





--Right-sided atelectasis/pneumonia


Empiric antibiotics, follow cultures





--Uncontrolled diabetes mellitus; continue metformin, glipizide and


Insulin 7030 14 units twice a day, A1c 11.9, ADA diet


Diabetic education, nutrition education,


Home health nurse for disease monitoring at discharge





--Hypertension; well controlled


Continue current antihypertensives amlodipine


When necessary medications





--Dyslipidemia; stable on statin, low cholesterol diet





--DVT prophylaxis; Lovenox/SCD





--Full CODE STATUS





--Physical therapy for general debility and home health needs


Increase ambulation








Disposition: DC/TX-06 HOME UNDER HOME [facility]


Time spent for discharge: 32 min





Core Measure Documentation





- Palliative Care


Palliative Care/ Comfort Measures: Not Applicable





- Core Measures


Any of the following diagnoses?: none





Exam





- Constitutional


Vitals: 


                                        











Temp Pulse Resp BP Pulse Ox


 


 98.8 F   75   20   164/69   91 


 


 04/10/19 07:36  04/10/19 10:00  04/10/19 07:36  04/10/19 09:05  04/10/19 07:36











General appearance: Present: no acute distress, well-nourished





- EENT


Eyes: Present: PERRL, EOM intact





- Neck


Neck: Present: supple, normal ROM





- Respiratory


Respiratory effort: normal


Respiratory: bilateral: diminished, negative: rales, rhonchi, wheezing





- Cardiovascular


Rhythm: regular


Heart Sounds: Present: S1 & S2





- Extremities


Extremities: no ischemia, No edema





- Abdominal


General gastrointestinal: Present: soft, non-tender, non-distended, normal bowel

 sounds





- Integumentary


Integumentary: Present: clear, warm





- Musculoskeletal


Musculoskeletal: strength equal bilaterally, generalized weakness





- Psychiatric


Psychiatric: appropriate mood/affect, cooperative





- Neurologic


Neurologic: CNII-XII intact, moves all extremities





Plan


Activity: advance as tolerated, fall precautions


Diet: diabetic


Special Instructions: record blood sugar diary


Additional Instructions: Advised to see private endocrinologist in 1-2 weeks


Follow up with: 


DEANN HOLLIS MD [Staff Physician] - 3-5 Days


Prescriptions: 


Lispro Insulin [Humalog] See Protocol SQ ACHS 30 Days  vial


Insulin NPH/Regular [NovoLIN 70/30] 15 unit SUB-Q BIDDIAB 30 Days  units


Azithromycin [Zithromax Z-RHONDA] 0 mg PO DAILY #1 tab


Other Discharge Orders: 


Glucometer  (Amb) Location: None Selected


Glucometer supplies[Amb] Location: None Selected